# Patient Record
Sex: MALE | Race: BLACK OR AFRICAN AMERICAN | NOT HISPANIC OR LATINO | URBAN - METROPOLITAN AREA
[De-identification: names, ages, dates, MRNs, and addresses within clinical notes are randomized per-mention and may not be internally consistent; named-entity substitution may affect disease eponyms.]

---

## 2019-10-20 ENCOUNTER — EMERGENCY (EMERGENCY)
Facility: HOSPITAL | Age: 34
LOS: 0 days | Discharge: HOME | End: 2019-10-20
Admitting: EMERGENCY MEDICINE
Payer: SUBSIDIZED

## 2019-10-20 VITALS
TEMPERATURE: 96 F | RESPIRATION RATE: 16 BRPM | DIASTOLIC BLOOD PRESSURE: 62 MMHG | SYSTOLIC BLOOD PRESSURE: 111 MMHG | OXYGEN SATURATION: 100 % | HEART RATE: 61 BPM

## 2019-10-20 DIAGNOSIS — K13.79 OTHER LESIONS OF ORAL MUCOSA: ICD-10-CM

## 2019-10-20 DIAGNOSIS — K14.8 OTHER DISEASES OF TONGUE: ICD-10-CM

## 2019-10-20 PROCEDURE — 99283 EMERGENCY DEPT VISIT LOW MDM: CPT

## 2019-10-20 RX ORDER — HYDROGEN PEROXIDE 0.3 KG/100L
1 LIQUID TOPICAL
Qty: 16 | Refills: 0
Start: 2019-10-20 | End: 2019-10-26

## 2019-10-20 NOTE — ED PROVIDER NOTE - NS ED ROS FT
CONST: No fever, chills or bodyaches  EYES: No pain, redness, drainage or visual changes.  ENT: sore on left side of tongue. No ear pain or discharge, nasal discharge or congestion. No sore throat.   CARD: No chest pain, palpitations  RESP: No SOB, cough, hemoptysis. No hx of asthma or COPD  GI: No abdominal pain, N/V/D  SKIN: No rashes  NEURO: No headache, or dizziness

## 2019-10-20 NOTE — ED PROVIDER NOTE - PHYSICAL EXAMINATION
CONST: Well appearing in NAD  EYES: Sclera and conjunctiva clear. pytergium on the left eye.   ENT: No nasal discharge. Oropharynx normal appearing, no erythema or exudates. Uvula midline. no ulcers noted.  .5cm erythematous papule on the left posterior aspect of the tongue.   SKIN: Warm, dry, no acute rashes.   NEURO: A&Ox3

## 2019-10-20 NOTE — ED PROVIDER NOTE - OBJECTIVE STATEMENT
RG is a 33 y/o male with no significant PMH who presents to the ED with a sore on the left side of his tongue that began three RG is a 33 y/o male with no significant PMH who presents to the ED with a sore on the left side of his tongue that began three days ago. Patient states pain is worse with eating, swallowing, and talking. Patient denies sores on any other body parts. Patient is not concerned for STDs. Patient is sexually active with one partner, does not use protection. Patient denies fever, chills, rashes, nausea, vomiting, visual changes, burning with urination, sob, or drooling.

## 2019-10-20 NOTE — ED PROVIDER NOTE - CLINICAL SUMMARY MEDICAL DECISION MAKING FREE TEXT BOX
RG is a 35 y/o male with no significant PMH who presents to the ED with a .5cm erythematous papule on the left posterior aspect of the tongue. patient is afebrile. Patient prescribed orajel mouth wash, and advised to f/u with dental clinic. patient is advised of return precautions and is agreeable to discharge.

## 2019-10-20 NOTE — ED PROVIDER NOTE - PATIENT PORTAL LINK FT
You can access the FollowMyHealth Patient Portal offered by [facility] by registering at the following website: [email or web address]. By joining [facility]’s FollowMyHealth portal, you will also be able to view your health information using other applications (apps) compatible with our system.

## 2019-10-20 NOTE — ED PROVIDER NOTE - NSFOLLOWUPCLINICS_GEN_ALL_ED_FT
[facility]  Dental  [address] [place] [id number]  Phone: [phone]  Fax:   Follow Up Time: 1-3 Days

## 2019-10-20 NOTE — ED ADULT TRIAGE NOTE - MODE OF ARRIVAL
Telephone Encounter by Martha Rubio at 01/17/17 10:22 AM     Author:  Martha Rubio Service:  (none) Author Type:       Filed:  01/17/17 10:22 AM Encounter Date:  1/12/2017 Status:  Signed     :  Martha Rubio ()            Left message to schedule[JQ1.1T] Echo.[JQ1.1M]      Revision History        User Key Date/Time User Provider Type Action    > JQ1.1 01/17/17 10:22 AM Martha Rubio  Sign    M - Manual, T - Template             Walk in

## 2019-12-05 ENCOUNTER — OUTPATIENT (OUTPATIENT)
Dept: OUTPATIENT SERVICES | Facility: HOSPITAL | Age: 34
LOS: 1 days | Discharge: HOME | End: 2019-12-05
Payer: COMMERCIAL

## 2019-12-05 DIAGNOSIS — R07.9 CHEST PAIN, UNSPECIFIED: ICD-10-CM

## 2019-12-05 PROCEDURE — 71046 X-RAY EXAM CHEST 2 VIEWS: CPT | Mod: 26

## 2020-01-17 ENCOUNTER — EMERGENCY (EMERGENCY)
Facility: HOSPITAL | Age: 35
LOS: 0 days | Discharge: HOME | End: 2020-01-17
Admitting: EMERGENCY MEDICINE
Payer: COMMERCIAL

## 2020-01-17 VITALS
RESPIRATION RATE: 18 BRPM | DIASTOLIC BLOOD PRESSURE: 77 MMHG | TEMPERATURE: 97 F | OXYGEN SATURATION: 100 % | SYSTOLIC BLOOD PRESSURE: 136 MMHG | HEART RATE: 65 BPM

## 2020-01-17 DIAGNOSIS — R51 HEADACHE: ICD-10-CM

## 2020-01-17 DIAGNOSIS — H11.002 UNSPECIFIED PTERYGIUM OF LEFT EYE: ICD-10-CM

## 2020-01-17 PROCEDURE — 99283 EMERGENCY DEPT VISIT LOW MDM: CPT

## 2020-01-17 RX ORDER — OFLOXACIN 0.3 %
1 DROPS OPHTHALMIC (EYE) ONCE
Refills: 0 | Status: DISCONTINUED | OUTPATIENT
Start: 2020-01-17 | End: 2020-01-17

## 2020-01-17 NOTE — ED PROVIDER NOTE - PATIENT PORTAL LINK FT
You can access the FollowMyHealth Patient Portal offered by [facility] by registering at the following website: [email or web address]. By joining [facility]’s FollowMyHealth portal, you will also be able to view your health information using other applications (apps) compatible with our system.

## 2020-01-17 NOTE — ED PROVIDER NOTE - OBJECTIVE STATEMENT
Patient is a 34 years old M no pmhx presents to the ED for evaluation of 2-3 months of intermittent headache generalized seen by pmd and the ED in the past for the headache, no fever/chills, no rash, headache associated with nausea at times but not today, no headache today, no visual changes, no localized weakness. Recently moved here from [facility] in October 2019. Sts here for redness to left eye, no FB sensation. Sts similar sx in past resolved with abx.

## 2020-01-17 NOTE — ED PROVIDER NOTE - CLINICAL SUMMARY MEDICAL DECISION MAKING FREE TEXT BOX
+pterigium left eye mild erythema to left eye will treat for conjunctivitis but low likelihood follow up with optho discussed

## 2020-01-17 NOTE — ED PROVIDER NOTE - PHYSICAL EXAMINATION
Gen: Alert, NAD, well appearing  Head: NC, AT, PERRL, EOMI, +pterygium on left eye minimal redness no drainage  ENT: normal hearing, patent oropharynx without erythema/exudate, uvula midline  Neck: +supple, no tenderness  Pulm: Bilateral BS, normal resp effort, no wheeze/stridor/retractions  CV: RRR  Abd: soft, NT/ND  Neuro: AAOx3

## 2020-01-17 NOTE — ED PROVIDER NOTE - NS ED ROS FT
Review of Systems    Constitutional: (-) fever  Eyes/ENT: (-) blurry vision, (-) epistaxis  Cardiovascular: (-) chest pain, (-) syncope  Respiratory: (-) cough, (-) shortness of breath  Gastrointestinal: (-) vomiting, (-) diarrhea  Musculoskeletal: (-) neck pain, (-) back pain, (-) joint pain  Integumentary: (-) rash, (-) edema  Neurological: (+) headache

## 2022-01-15 NOTE — ED ADULT NURSE NOTE - CHIEF COMPLAINT QUOTE
"I have a sore on my tongue and its making it difficult to swallow." straight axis cane/independent/needs device